# Patient Record
Sex: MALE | Race: WHITE | NOT HISPANIC OR LATINO
[De-identification: names, ages, dates, MRNs, and addresses within clinical notes are randomized per-mention and may not be internally consistent; named-entity substitution may affect disease eponyms.]

---

## 2022-09-23 ENCOUNTER — APPOINTMENT (OUTPATIENT)
Dept: PODIATRY | Facility: CLINIC | Age: 57
End: 2022-09-23

## 2022-09-23 VITALS — BODY MASS INDEX: 25.84 KG/M2 | HEIGHT: 73 IN | WEIGHT: 195 LBS

## 2022-09-23 DIAGNOSIS — S92.211A DISPLACED FRACTURE OF CUBOID BONE OF RIGHT FOOT, INITIAL ENCOUNTER FOR CLOSED FRACTURE: ICD-10-CM

## 2022-09-23 DIAGNOSIS — Z78.9 OTHER SPECIFIED HEALTH STATUS: ICD-10-CM

## 2022-09-23 PROBLEM — Z00.00 ENCOUNTER FOR PREVENTIVE HEALTH EXAMINATION: Status: ACTIVE | Noted: 2022-09-23

## 2022-09-23 PROCEDURE — 99203 OFFICE O/P NEW LOW 30 MIN: CPT | Mod: 57

## 2022-09-23 PROCEDURE — 28450 TX TARSAL B1 FX W/O MNPJ EA: CPT | Mod: RT

## 2022-09-23 NOTE — REASON FOR VISIT
[FreeTextEntry2] : Patient is here for right foot injury DOI 9/21/2022 at his own home. Patent stated he was going to his pool over the set of stones the pool goes to and the stone was loose and patient rolled his foot and fell. Patient went to Dr Isaac today 9/23/2022 and had xray done.

## 2022-09-23 NOTE — ASSESSMENT
[FreeTextEntry1] : Cam walker dispensed with instructions.  \par NSAID OF CHOICE\par TYLENOL, COLD COMPRESSES\par WARM COMPRESSES.

## 2022-09-23 NOTE — PHYSICAL EXAM
[Moderate] : moderate swelling of lateral ankle [] : calcaneocuboid joint tenderness [Right] : right foot [Outside films reviewed] : Outside films reviewed [The fracture is in acceptable alignment. There is progression in healing seen] : The fracture is in acceptable alignment. There is progression in healing seen [de-identified] : Cuboid fracture lateral,  intraarticular, displaced

## 2022-10-21 ENCOUNTER — APPOINTMENT (OUTPATIENT)
Dept: PODIATRY | Facility: CLINIC | Age: 57
End: 2022-10-21

## 2022-10-21 PROCEDURE — 99024 POSTOP FOLLOW-UP VISIT: CPT

## 2022-10-21 PROCEDURE — 73630 X-RAY EXAM OF FOOT: CPT | Mod: RT
